# Patient Record
Sex: MALE
[De-identification: names, ages, dates, MRNs, and addresses within clinical notes are randomized per-mention and may not be internally consistent; named-entity substitution may affect disease eponyms.]

---

## 2018-07-11 ENCOUNTER — HOSPITAL ENCOUNTER (EMERGENCY)
Dept: HOSPITAL 14 - H.ER | Age: 73
Discharge: HOME | End: 2018-07-11
Payer: MEDICARE

## 2018-07-11 VITALS — HEART RATE: 84 BPM | RESPIRATION RATE: 18 BRPM | OXYGEN SATURATION: 99 % | TEMPERATURE: 98 F

## 2018-07-11 VITALS — DIASTOLIC BLOOD PRESSURE: 74 MMHG | SYSTOLIC BLOOD PRESSURE: 136 MMHG

## 2018-07-11 DIAGNOSIS — S90.121A: Primary | ICD-10-CM
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
## 2018-07-11 NOTE — ED PDOC
Lower Extremity Pain/Injury


Time Seen by Provider: 07/11/18 20:56


Chief Complaint (Nursing): Lower Extremity Problem/Injury


Chief Complaint (Provider): right foot injury


History Per: Patient, Family (Patient's granddaughter at bedside is translating 

Sing[place]an for patient)


Additional Complaint(s): 


72-year-old male presents with pain to right fifth toe status post a drill 

falling on top of his foot earlier today. Patient able to ambulate but has pain 

when walking. No medicine taken for pain relief prior to arrival, no numbness 

or tingling to affected area.





PMD: Yeny Melendez








Past Medical History


Reviewed: Historical Data, Nursing Documentation, Vital Signs


Vital Signs: 





 Last Vital Signs











Temp  98 F   07/11/18 20:51


 


Pulse  84   07/11/18 20:51


 


Resp  18   07/11/18 20:51


 


BP  136/74   07/11/18 20:51


 


Pulse Ox  99   07/11/18 20:51














- Medical History


PMH: No Chronic Diseases





- Surgical History


Surgical History: Appendectomy, Cholecystectomy





- Family History


Family History: States: No Known Family Hx





- Living Arrangements


Living Arrangements: With Family





- Social History


Current smoker - smoking cessation education provided: No


Alcohol: None


Drugs: Denies





- Allergies


Allergies/Adverse Reactions: 


 Allergies











Allergy/AdvReac Type Severity Reaction Status Date / Time


 


No Known Allergies Allergy   Verified 07/11/18 20:51














Review of Systems


ROS Statement: Except As Marked, All Systems Reviewed And Found Negative


Musculoskeletal: Positive for: Foot Pain (injury to right great toe)





Physical Exam





- Reviewed


Nursing Documentation Reviewed: Yes


Vital Signs Reviewed: Yes





- Physical Exam


Appears: Positive for: Non-toxic, No Acute Distress


Head Exam: Positive for: ATRAUMATIC, NORMAL INSPECTION, NORMOCEPHALIC


Skin: Positive for: Normal Color


Eye Exam: Positive for: Normal appearance


Extremity: Positive for: Other (Ecchymosis and tenderness to right fifth toe 

with decreased range of motion, remainder of foot is nontender with no 

ecchymosis, normal distal pulses, normal cap refill)


Neurologic/Psych: Positive for: Alert, Oriented (x3)





- ECG


O2 Sat by Pulse Oximetry: 99 (RA)


Pulse Ox Interpretation: Normal





- Other Rad


  ** Right foot x-ray


X-Ray: Interpreted by Me, Viewed By Me


X-Ray Interpretation: no fx, no dis





Medical Decision Making


Medical Decision Making: 


Time: 2103


Initial Impression: 72 year old male with right foot injury





Initial Plan:


--Foot Right 3 Views Routine [RAD] 


--Patient given Motrin 600mg 


--Podiatry resident, Misty Crowe, at bedside to see patient - states x-ray 

demonstrates no fracture.  Buddy tape and or tissue applied by resident. 

Patient advised ice and elevate affected area, take NSAIDs for pain and follow-

up in 2 weeks with podiatry clinic.





--------------------------------------------------------------------------------

-----------------


Scribe Attestation:   


Documented by Johann Isabel, acting as a scribe for Nancy Jones PA-C





Provider Scribe Attestation:


All medical record entries made by the Scribe were at my direction and 

personally dictated by me. I have reviewed the chart and agree that the record 

accurately reflects my personal performance of the history, physical exam, 

medical decision making, and the department course for this patient. I have 

also personally directed, reviewed, and agree with the discharge instructions 

and disposition.





Disposition





- Clinical Impression


Clinical Impression: 


 Toe contusion








- Patient ED Disposition


Is Patient to be Admitted: No


Counseled Patient/Family Regarding: Studies Performed, Diagnosis, Need For 

Followup





- Disposition


Referrals: 


Podiatry Clinic [Outside]


Disposition: Routine/Home


Disposition Time: 23:09


Condition: STABLE


Additional Instructions: 


Ice, elevate and rest affected area. Take over-the-counter ibuprofen for pain 

as needed. Follow-up in podiatry clinic in 2 weeks.


Instructions:  Toe Injury, Contusion (DC)


Forms:  [employer] ([place]an)


Print Language: Chilean

## 2018-07-11 NOTE — CP.PCM.CON
History of Present Illness





- History of Present Illness


History of Present Illness: 


Podiatry Consult Note for Dr. Carty 





Patient is a 72 year old Argentine speaking male, who denies any PMHx, who 

presents today after dropping a drill on his right foot. Patient states that he 

dropped the drill about 3 hours ago and came into the emergency room when he 

noticed the bruising. He rates the pain 2/10, with the pain being worse when he 

ambulates. Denies taking any medication for the pain. Patient is accompanied by 

his daughter. He denies any N/V/F/SOB/CP. Denies any other pedal complaints at 

this time. 





PMHx: Denies


PSHx: Appendectomy, Cholecystectomy


FH: Denies any pertinent family history


Social: Denies tobacco use, occasional alcohol use


All: NKDA





Review of Systems





- Review of Systems


Review of Systems: 





As per HPI





Past Patient History





- Past Social History


Alcohol: None


Drugs: Denies





- PSYCHIATRIC


Hx Substance Use: No





- SURGICAL HISTORY


Hx Appendectomy: Yes


Hx Cholecystectomy: Yes





Meds


Allergies/Adverse Reactions: 


 Allergies











Allergy/AdvReac Type Severity Reaction Status Date / Time


 


No Known Allergies Allergy   Verified 07/11/18 20:51














Physical Exam





- Constitutional


Appears: Well, Non-toxic, No Acute Distress





- Head Exam


Head Exam: ATRAUMATIC, NORMOCEPHALIC





- Extremities Exam


Additional comments: 





Vascular:  DP/PT 2/4 bilaterally. CFT <3 seconds to all 10 digits. TG warm to 

warm bilaterally. Mild edema noted circumferentially to the right 5th digit. 


Ortho: Pain upon palpation of right 5th digit dorsally and plantarly around the 

distal and middle phalanx. MMT 5/5 bilaterally. No tenderness to palpation of 

the tarsometarsal joint. 


Neuro: Gross and protective sensation intact bilaterally. 


Derm: Ecchymosis noted plantarly to the right 5th digit extending dorsal 

medial. No open lesions. No signs of infection. Mild erythema to dorsal-lateral 

aspect of right 5th digit. Interdigital maceration noted left 4th interspace. 





- Neurological Exam


Neurological exam: Alert, Oriented x3





- Psychiatric Exam


Psychiatric exam: Normal Affect, Normal Mood





Results





- Vital Signs


Recent Vital Signs: 


 Last Vital Signs











Temp  98 F   07/11/18 20:51


 


Pulse  84   07/11/18 20:51


 


Resp  18   07/11/18 20:51


 


BP  136/74   07/11/18 20:51


 


Pulse Ox  99   07/11/18 23:09














Assessment & Plan





- Assessment and Plan (Free Text)


Assessment: 





Patient is a 72 year old male, who denies any PMHx, who presents today with a 

right 5th digit contusion.


Plan: 





Patient examined and evaluated at bedside


Discussed in detail with Dr. Carty 


X-rays read and reviewed: Area of radiolucency noted to lateral aspect of distal

/middle phalanx of 5th digit. Synostosis of the middle and distal phalanx of 

the 5th digit noted. Inconclusive for fracture. 


Right 4th and 5th digits francisco splinted and patient placed in surgical shoe


Patient may weightbear to right foot as tolerated  


Discussed shoe gear modification with patient


Advised patient to ice and elevate right lower extremity 


F/U in 2 weeks in Dr. Carty's clinic. 





- Date & Time


Date: 07/11/18

## 2018-07-12 NOTE — RAD
Date of service: 



07/11/2018



PROCEDURE:  Right Foot Radiographs.



HISTORY:

trauma  



COMPARISON:

None.



FINDINGS:



BONES:

Normal. No fracture. 



JOINTS:

Normal. 



SOFT TISSUES:

Normal. 



OTHER FINDINGS:

None.



IMPRESSION:

Normal right foot radiographs.